# Patient Record
Sex: FEMALE | Race: WHITE
[De-identification: names, ages, dates, MRNs, and addresses within clinical notes are randomized per-mention and may not be internally consistent; named-entity substitution may affect disease eponyms.]

---

## 2017-03-02 ENCOUNTER — HOSPITAL ENCOUNTER (OUTPATIENT)
Dept: HOSPITAL 37 - NWCC | Age: 47
End: 2017-03-02
Attending: SURGERY
Payer: COMMERCIAL
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
## 2017-03-02 DIAGNOSIS — B96.89: ICD-10-CM

## 2017-03-02 DIAGNOSIS — T81.4XXA: Primary | ICD-10-CM

## 2017-03-02 PROCEDURE — 11042 DBRDMT SUBQ TIS 1ST 20SQCM/<: CPT

## 2017-03-02 NOTE — WOUNDPNF
DATE

03/02/2017

 

HISTORY

This patient underwent incision and drainage of a very large subcutaneous abscess at the right upper 
back

by Dr. Murray on 02/16/2017.  The patient did initially have a wound VAC applied at this open wound. 
 The

insurance company did deny use of the wound VAC for the patient.  The wound VAC was therefore 
removed at

the office visit on 02/27/2017.  Some Hydrofera Blue Classic Antibacterial Foam dressing was placed 
into

the wound at that time.  A 4 inch x 4 inch (10.2 cm x 10.2 cm) piece of Hydrofera Blue Classic

Antibacterial Foam dressing was placed in the wound at that time.  This was covered with a Mepilex

dressing.  The patient comes back to [facility] today for a dressing

change.

 

PHYSICAL EXAMINATION

BACK:  The patient does have a large transversely oriented open wound at the right upper back.  This

wound is 8.6 cm long in a right-to-left direction.  The wound is 0.9 cm wide in a 
superior-to-inferior

direction.  The wound is 3.2 cm deep today.  There is healthy red granulation tissue at most of the 
open

surface of the wound.  There is some white devitalized tissue at about 5% of the wound at the

subcutaneous tissue level.

VITAL SIGNS:  Temperature is 96.7 degrees.  Blood pressure is 162/93.  Pulse is 85.  Weight is 250 
pounds.

 

IMPRESSION

Large open wound at right upper back following incision and drainage of large subcutaneous abscess 
on

02/16/2017.

 

TREATMENT

The Mepilex and Hydrofera Blue Classic Antibacterial Foam dressings were removed at the open wound 
at the

right upper back today by [facility] nurses.  Wound assessment was

performed by Dr. Murray.  Some EMLA Cream was applied to the open wound for topical anesthesia.  
Sharp

excisional debridement of nonviable white devitalized tissue at the subcutaneous tissue level was 
then

performed by Dr. Murray.  This was sharp excisional debridement performed by Dr. Murray with a 
dermal

curette.  This sharp excisional debridement with a dermal curette was performed at about 5% of the 
open

surface of the wound.  This did result in some bleeding which was controlled with application of 
direct

pressure.  Satisfactory hemostasis was achieved.  Instructions for ongoing care were then provided 
by Dr. Murray.  The [facility] nurses then placed more of the Hydrofera 
Blue

Classic Antibacterial Foam dressing into the wound and covered this with some Mepilex dressing.

 

PLAN

1.  The patient will return back to [facility] again on 03/06/2017 
for

another dressing change.

2.  The patient will return back to [facility] for another dressing

change as well as assessment of the wound again by Dr. Murray on 03/09/2017.

 

CEDRICK

## 2017-03-06 ENCOUNTER — HOSPITAL ENCOUNTER (OUTPATIENT)
Dept: HOSPITAL 37 - NWCC | Age: 47
End: 2017-03-06
Attending: SURGERY
Payer: COMMERCIAL

## 2017-03-06 DIAGNOSIS — B96.89: ICD-10-CM

## 2017-03-06 DIAGNOSIS — I10: ICD-10-CM
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
## 2017-03-06 DIAGNOSIS — T81.4XXA: Primary | ICD-10-CM

## 2017-03-09 ENCOUNTER — HOSPITAL ENCOUNTER (OUTPATIENT)
Dept: HOSPITAL 37 - NWCC | Age: 47
End: 2017-03-09
Attending: SURGERY
Payer: COMMERCIAL
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
## 2017-03-09 DIAGNOSIS — B96.89: ICD-10-CM

## 2017-03-09 DIAGNOSIS — T81.4XXA: Primary | ICD-10-CM

## 2017-03-09 PROCEDURE — 99213 OFFICE O/P EST LOW 20 MIN: CPT

## 2017-03-10 NOTE — WOUNDPNF
DATE

03/09/2017

 

HISTORY

This patient underwent incision and drainage of a very large subcutaneous 
abscess at the right upper back

by Dr. Murray on 02/16/2017.  The patient did initially have a wound VAC 
applied to this open wound.  The

insurance company did deny use of the wound VAC for the patient.  The wound VAC 
was therefore removed at

the [facility] visit on 02/27/2017.  The 
patient has had dressing changes at the

wound performed with Hydrofera Blue Classic antibacterial foam dressing covered 
with Mepilex dressing

since 02/27/2017.  The patient does come in for a [facility] visit today

for a dressing change.

 

PHYSICAL EXAMINATION

VITAL SIGNS:  Blood pressure is 151/84. Temperature is 97.2 degrees.  Pulse is 
60.  Weight is 250 pounds.

BACK:  The patient does have a large transversely oriented open wound at the 
right upper back.  This

wound is 8.2 cm long in a right-to-left direction.  The wound is 1.2 cm wide in 
a superior-to-inferior

direction.  The wound is 2.5 cm deep today.  There is healthy red granulation 
tissue at most of the open

surface of the wound.  There is some white devitalized tissue at about 10% of 
the subcutaneous tissue

level of the open wound.

 

IMPRESSION

Large open wound at right upper back following incision and drainage of large 
subcutaneous abscess on

02/16/2016.

 

TREATMENT

The Mepilex and Hydrofera Blue Classic antibacterial foam dressings were 
removed at the open wound at the

right upper back today by [facility] nurses.  
Wound assessment was

performed by Dr. Murray.  Sharp excisional debridement of nonviable white 
devitalized tissue at the

subcutaneous tissue level was then performed by Dr. Murray.  This was sharp 
excisional debridement

performed by Dr. Murray with a dermal curette.  This sharp excisional 
debridement with a dermal curette

was performed at about 10% of the open surface of the wound.  This did result 
in some bleeding which was

controlled with application of direct pressure.  Satisfactory hemostasis was 
achieved.  Instructions for

ongoing care were then provided by Dr. Murray.  The [facility] nurses

then placed more of the Hydrofera Blue Classic antibacterial foam dressing into 
the wound and covered

this with some Mepilex dressing.

 

PLAN

1.  Continue dressing changes with Hydrofera Blue Classic antibacterial foam 
dressing covered with

Mepilex dressings.  We will reapply with the insurance company for use of the 
wound VAC again after 30

days of treatment with failure of the wound to heal after 30 days.

2.  The patient will return back to [facility] 
again on 03/13/2017 for

another dressing change.

3.  The patient will then return back to [facility] again for another

dressing change as well as assessment of the wound again by Dr. Murray on 03/16/
2017.

 


CEDRICK

## 2017-03-13 ENCOUNTER — HOSPITAL ENCOUNTER (OUTPATIENT)
Dept: HOSPITAL 37 - NWCC | Age: 47
End: 2017-03-13
Attending: SURGERY
Payer: COMMERCIAL
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
## 2017-03-13 DIAGNOSIS — B96.89: ICD-10-CM

## 2017-03-13 DIAGNOSIS — T81.4XXA: Primary | ICD-10-CM

## 2017-03-16 ENCOUNTER — HOSPITAL ENCOUNTER (OUTPATIENT)
Dept: HOSPITAL 37 - NWCC | Age: 47
End: 2017-03-16
Attending: SURGERY
Payer: COMMERCIAL

## 2017-03-16 DIAGNOSIS — B96.89: ICD-10-CM
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
## 2017-03-16 DIAGNOSIS — T81.4XXA: Primary | ICD-10-CM

## 2017-03-16 PROCEDURE — 11042 DBRDMT SUBQ TIS 1ST 20SQCM/<: CPT

## 2017-03-16 NOTE — WOUNDPNF
DATE

03/16/2017

 

HISTORY

This patient underwent incision and drainage of a very large subcutaneous 
abscess at the right upper back

by Dr. Murray on 02/16/2017.  The patient did initially have a wound VAC 
applied to this open wound.  The

insurance company did deny use of the wound VAC for the patient.  The wound VAC 
was therefore removed at

[facility] on 02/27/2017.  The patient then had 
dressing changes at the

wound performed from 02/27/2017 up until today with Hydrofera Blue Classic 
Antibacterial Foam Dressing

covered with Mepilex dressing.  The insurance company has now approved use of 
the wound VAC so the

patient can start using the wound VAC again.

 

PHYSICAL EXAMINATION

BACK:  The patient does have a transversely oriented open wound at the right 
upper back.  There is no necrotic

tissue or purulence at the wound.  The patient does have skin growing deep down 
into the subcutaneous

tissue level around all sides of the wound.  The skin is growing down deep into 
the wound well below the

level of the surrounding skin on all sides.  When the open portion of the wound 
is measured not including

the area where skin is growing down into the subcutaneous tissue level, the 
wound appears to be 5.8 cm

long in a right-to-left direction, 0.8 cm wide in a superior-to-inferior 
direction and 1.8 cm deep.  This

was prior to any debridement today.

 

IMPRESSION

Large open wound at right upper back following incision and drainage of a large 
subcutaneous abscess on

02/16/2017.

 

TREATMENT

The Mepilex and Hydrofera Blue Classic Antibacterial Foam dressings were 
removed at the open wound at the

right upper back today by [facility] nurses.  
Wound assessment was

performed by Dr. Murray.  All of this new epidermis which was growing down over 
the subcutaneous tissue

deep down into the subcutaneous tissue level on all sides of the wound was 
removed with sharp excisional

debridement by Dr. Murray with a dermal curette.  EMLA Cream was first applied 
to the open wound for a

while before this was started to produce topical anesthesia.  After this, Dr. Murray did use a dermal

curette to remove all of the skin which was growing down deep into the 
subcutaneous tissue level on all

sides of the wound so this wound will have a normal contour after it heals and 
not have a deep hole at

the back lined with skin.  This sharp excisional debridement with a dermal 
curette did produce quite a

bit of bleeding.  This bleeding was controlled with chemical cauterization with 
silver nitrate sticks.

Satisfactory hemostasis was achieved.  Instructions for ongoing care were then 
provided by Dr. Murray.

The wound was measured after all this debridement of skin growing down into the 
subcutaneous tissue

level.  The measurements after debridement showed that the wound was 7.5 cm 
long in a right-to-left

direction, 1.5 cm wide in a superior-to-inferior direction and 1.6 cm deep.  
The [facility]

[facility] nurses did then apply the wound VAC dressing to this open 
wound.  Negative pressure

for a wound VAC was set at 125 mmHg.

 

PLAN

1.  Continue negative pressure therapy to open wound at right upper back with a 
wound VAC.

2.  Return back to [facility] [place] for a wound VAC 
dressing change again on

03/20/2017.

3.  The patient will return back to [facility] 
for another wound VAC

dressing change again on 03/23/2017.  The wound will be reassessed by Dr. Murray again at that time.

 

CEDRICK

## 2017-03-20 ENCOUNTER — HOSPITAL ENCOUNTER (OUTPATIENT)
Dept: HOSPITAL 37 - NWCC | Age: 47
End: 2017-03-20
Attending: SURGERY
Payer: COMMERCIAL

## 2017-03-20 DIAGNOSIS — B96.89: ICD-10-CM

## 2017-03-20 DIAGNOSIS — T81.4XXA: Primary | ICD-10-CM
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
## 2017-03-20 PROCEDURE — 97605 NEG PRS WND THER DME<=50SQCM: CPT

## 2017-03-23 ENCOUNTER — HOSPITAL ENCOUNTER (OUTPATIENT)
Dept: HOSPITAL 37 - NWCC | Age: 47
End: 2017-03-23
Attending: SURGERY
Payer: COMMERCIAL

## 2017-03-23 DIAGNOSIS — T81.4XXA: Primary | ICD-10-CM
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
## 2017-03-23 DIAGNOSIS — B96.89: ICD-10-CM

## 2017-03-23 PROCEDURE — 11042 DBRDMT SUBQ TIS 1ST 20SQCM/<: CPT

## 2017-03-23 PROCEDURE — 97605 NEG PRS WND THER DME<=50SQCM: CPT

## 2017-03-24 NOTE — WOUNDPNF
DATE

03/23/2017

 

HISTORY

This patient underwent incision and drainage of a very large subcutaneous 
abscess at the right upper back

by Dr. Murray on 02/16/2017.  She has an open wound at the incision and 
drainage site.  The patient has a

wound V.A.C. applied to this open wound at this time.

 

PHYSICAL EXAMINATION

BACK:  The patient does have a transversely oriented open wound at the right 
upper back.  There is no

necrotic tissue or purulence at the wound.  The patient does have some skin 
growing down into the

subcutaneous tissue level at about 20% of the open surface of the wound.  This 
is skin growing down to

the base of the wound deep to the level of the skin at the subcutaneous tissue 
level.  The wound is 6.5

cm long in a right-to-left direction.  The wound is 0.8 cm wide in a superior-to
-inferior direction.  The

wound is 1 cm deep today.

 

IMPRESSION

Large open wound at right upper back following incision and drainage of a large 
subcutaneous abscess on

02/16/2017.

 

TREATMENT

The wound V.A.C. was removed today from the wound at the right upper back by 
[facility] nurses.  EMLA cream was applied to the open wound for a while to 
produce topical

anesthesia.  Wound assessment was then performed by Dr. Murray.  Dr. Murray did 
use a dermal curette to

remove all the areas where skin was growing deep down into the subcutaneous 
tissue level.  This was sharp

excisional debridement with a dermal curette at about 20% of the surface of the 
open wound today.  This

sharp excisional debridement did produce bleeding which was controlled by 
chemical cauterization with

silver nitrate sticks.  Satisfactory hemostasis was achieved.  Instructions for 
ongoing care were then

provided by Dr. Murray.  It was thought that this debridement of the skin which 
was growing deep down

into the subcutaneous tissue level would allow the wound to heal in a way that 
would produce a normal

contour after it heals and prevent the patient from having a deep hole at the 
back lined with skin.

[facility] nurses did apply a new wound V.A.C. 
dressing to the open

wound.  Negative pressure for the wound V.A.C. is set at 150 mmHg.

 

PLAN

1.  Continue negative pressure therapy to open wound at right upper back with 
wound V.A.C. at 150 mmHg

negative pressure.

2.  Return back to [facility] for another wound 
V.A.C. dressing change

again on 03/27/2017.

3.  The patient will return back to [facility] 
for another wound V.A.C.

dressing change again on 03/30/2017.  The wound will be reassessed by Dr. Murray again at that time.

 

CEDRICK

## 2017-03-27 ENCOUNTER — HOSPITAL ENCOUNTER (OUTPATIENT)
Dept: HOSPITAL 37 - NWCC | Age: 47
End: 2017-03-27
Attending: SURGERY
Payer: COMMERCIAL
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
## 2017-03-27 DIAGNOSIS — T81.4XXA: Primary | ICD-10-CM

## 2017-03-27 DIAGNOSIS — B96.89: ICD-10-CM

## 2017-03-27 PROCEDURE — 97605 NEG PRS WND THER DME<=50SQCM: CPT

## 2017-03-30 ENCOUNTER — HOSPITAL ENCOUNTER (OUTPATIENT)
Dept: HOSPITAL 37 - NWCC | Age: 47
End: 2017-03-30
Attending: SURGERY
Payer: COMMERCIAL

## 2017-03-30 DIAGNOSIS — B96.89: ICD-10-CM
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
## 2017-03-30 DIAGNOSIS — T81.4XXA: Primary | ICD-10-CM

## 2017-03-30 PROCEDURE — 97597 DBRDMT OPN WND 1ST 20 CM/<: CPT

## 2017-03-30 NOTE — WOUNDPNF
DATE

03/30/2017

 

HISTORY

This patient underwent incision and drainage of a very large subcutaneous 
abscess at the right upper back

by Dr. Murray on 02/16/2017.  She has an open wound at the incision and 
drainage site.  The patient has a

wound V.A.C. applied to this open wound at this time.

 

PHYSICAL EXAMINATION

BACK:  The patient does have a transversely oriented open wound at the right 
upper back.  There is no

necrotic tissue or purulence at the wound.  The patient does have some skin 
growing down into the

subcutaneous tissue level at about 5% of the open surface of the wound.  The 
wound is 6 cm long in a

right-to-left direction.  The wound is 0.7 cm wide in a superior-to-inferior 
direction.  The wound is 0.7

cm deep today.

 

IMPRESSION

Large open wound at right upper back following incision and drainage of a large 
subcutaneous abscess on

02/16/2017.

 

TREATMENT

The wound V.A.C. was removed today from the wound at the right upper back by 
[facility] nurses.  Wound assessment was performed by Dr. Murray.  Dr. Murray did use a dermal

curette to remove the area where skin was growing down into the subcutaneous 
tissue level.  This was

sharp excisional debridement with a dermal curette at about 5% of the surface 
of the open wound today at

the subcutaneous tissue level.  This was done to remove the new epithelium 
which was growing down the

sides of the wound into the subcutaneous tissue level.  This sharp excisional 
debridement did produce

bleeding, which was controlled by chemical cauterization with silver nitrate 
sticks.  Satisfactory

hemostasis was achieved.  Instructions for ongoing care were provided by Dr. Murray.  It was thought that

this debridement of the skin which was growing down into the subcutaneous 
tissue level would allow the

wound to heal in a way that would produce a normal contour after it heals and 
prevent the patient from

having a deep hole at the back lined with skin.  [facility] nurses did

then apply a new wound V.A.C. dressing to the open wound.  Negative pressure 
for the wound V.A.C. was set

at 150 mmHg.

 

PLAN

1.  Continue negative-pressure therapy at open wound at right upper back with 
wound V.A.C. at 150 mmHg

negative pressure.

2.  Return back to [facility] again for another 
wound V.A.C. dressing

change on 04/03/2017.

3.  The patient will return back to [facility] 
for another wound V.A.C.

dressing change again on 04/06/2017.  The wound will be reassessed by Dr. Murray again at that time.

 

CEDRICK

## 2017-04-03 ENCOUNTER — HOSPITAL ENCOUNTER (OUTPATIENT)
Dept: HOSPITAL 37 - NWCC | Age: 47
End: 2017-04-03
Attending: SURGERY
Payer: COMMERCIAL
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
## 2017-04-03 DIAGNOSIS — T81.4XXA: Primary | ICD-10-CM

## 2017-04-03 DIAGNOSIS — B96.89: ICD-10-CM

## 2017-04-03 DIAGNOSIS — E11.8: ICD-10-CM

## 2017-04-03 PROCEDURE — 97605 NEG PRS WND THER DME<=50SQCM: CPT

## 2017-04-06 ENCOUNTER — HOSPITAL ENCOUNTER (OUTPATIENT)
Dept: HOSPITAL 37 - NWCC | Age: 47
End: 2017-04-06
Attending: SURGERY
Payer: COMMERCIAL

## 2017-04-06 DIAGNOSIS — B96.89: ICD-10-CM

## 2017-04-06 DIAGNOSIS — T81.4XXA: Primary | ICD-10-CM
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
## 2017-04-06 PROCEDURE — 99213 OFFICE O/P EST LOW 20 MIN: CPT

## 2017-04-07 NOTE — WOUNDPNF
DATE

04/06/2017

 

HISTORY

This patient underwent incision and drainage of a very large subcutaneous abscess at the right upper 
back

by Dr. Murray on 02/16/2017.  She has an open wound at the incision and drainage site.  The patient 
has

had a wound V.A.C. applied to this open wound up until this time.

 

PHYSICAL EXAMINATION

VITAL SIGNS:  Blood pressure is 176/92.  Pulse is 93.  Temperature is 96 degrees.  Respiratory rate 
is

18.  BACK:  The patient does have a transversely oriented open wound at the right upper back.  There 
is

no necrotic tissue or purulence at the wound.  The patient does have 100% of the open surface of the

wound consisting of healthy red granulation tissue.  The wound is 4.7 cm long in a right-to-left

direction.  The wound is 0.4 cm wide in a superior-to-inferior direction.  The wound is 0.3 cm deep 
today.

 

IMPRESSION

Large open wound at right upper back following incision and drainage of a large subcutaneous abscess 
on

02/16/2017.

 

TREATMENT

The wound V.A.C. was removed today at the wound at the right upper back by [facility] nurses.  Wound assessment was performed by Dr. Murray.  No debridement of the wound 
was

needed today.  Instructions for ongoing care were provided by Dr. Murray.  [facility] 
[facility] nurses did then apply some Jesi and Mepilex dressings to the open wound.

 

PLAN

1.  Discontinue negative pressure therapy with wound V.A.C..

2.  Dressing changes at open wound with Jesi and Mepilex wound care dressings every three days. 
The

patient will come back to [facility] for these dressing changes.

3.  The patient will come back for a dressing change at [facility] 
again

on 04/13/2017 and the wound will be reassessed by Dr. Murray again at that time.

 

CEDRICK

## 2017-04-10 ENCOUNTER — HOSPITAL ENCOUNTER (OUTPATIENT)
Dept: HOSPITAL 37 - NWCC | Age: 47
End: 2017-04-10
Attending: SURGERY
Payer: COMMERCIAL
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
## 2017-04-10 DIAGNOSIS — T81.4XXA: Primary | ICD-10-CM

## 2017-04-10 DIAGNOSIS — B96.89: ICD-10-CM

## 2017-04-13 ENCOUNTER — HOSPITAL ENCOUNTER (OUTPATIENT)
Dept: HOSPITAL 37 - NWCC | Age: 47
End: 2017-04-13
Attending: SURGERY
Payer: COMMERCIAL

## 2017-04-13 DIAGNOSIS — B96.89: ICD-10-CM
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
## 2017-04-13 DIAGNOSIS — T81.4XXA: Primary | ICD-10-CM

## 2017-04-13 PROCEDURE — 17250 CHEM CAUT OF GRANLTJ TISSUE: CPT

## 2017-04-14 NOTE — WOUNDPNF
DATE

04/13/2017

 

HISTORY

This patient underwent incision and drainage of a very large subcutaneous abscess at the right upper 
back

by Dr. Murray on 02/16/2017.  She has an open wound at the incision and drainage site which has 
slowly

been healing closed since then.  She has received treatment at [facility]

since the time of the procedure performed on 02/16/2017.

 

PHYSICAL EXAMINATION

BACK:  The patient does have an open wound at the right upper back which is nearly completely healed

closed at this time.  The patient has a transversely oriented scar at this area.  The only remaining 
open

area is a 0.1 cm x 0.3 cm open area.  There is a little bit of hypergranulation tissue at this tiny

remaining open area.  This is at the lateral end of the wound.

 

IMPRESSION

Open wound at right upper back following incision and drainage of a large subcutaneous abscess on

02/16/2017.

 

TREATMENT

The Mepilex wound care dressings which were applied at the last [facility]

[facility] visit were removed today by [facility] nurses.  Wound 
assessment

was performed by Dr. Murray.  The hypergranulation tissue which was projecting up above the level of

normal skin was cauterized with a silver nitrate stick today by Dr. Murray.  Instructions for 
ongoing

care were then provided by Dr. Murray.  The [facility] nurses did 
then

apply a Allevyn Classic adhesive wound care dressing to the remaining open area at the wound.

 

PLAN

1.  The Allevyn wound care dressing can be kept on for the next 5-7 days.

2.  An appointment is made for the patient to come back to [facility] 
[facility]

again on 04/20/2017 for reassessment of the wound by Dr. Murray again at that time.

3.  I did today give the patient a written letter stating that she can return back to work on 
04/24/2017

without restrictions.  This is a return to work letter for the employer of this patient.

 

CEDRICK

## 2017-04-20 ENCOUNTER — HOSPITAL ENCOUNTER (OUTPATIENT)
Dept: HOSPITAL 37 - NWCC | Age: 47
End: 2017-04-20
Attending: SURGERY
Payer: COMMERCIAL

## 2017-04-20 DIAGNOSIS — B96.89: ICD-10-CM

## 2017-04-20 DIAGNOSIS — T81.4XXA: Primary | ICD-10-CM
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
## 2017-04-21 NOTE — WOUNDPNF
DATE

04/20/2017

 

HISTORY

This patient underwent incision and drainage of a very large subcutaneous abscess at the right upper 
back

by Dr. Murray on 02/16/2017.  She did have an open wound at the incision and drainage site which has

gradually been healing closed since then with treatment at [facility].

 

PHYSICAL EXAMINATION

BACK:  The previous open wound at the right upper back is completely healed closed.  There is a

transversely oriented scar where this open wound had been located.  The wound is now healed 
completely

closed.

 

IMPRESSION

Complete healing of a large open wound at right upper back following incision and drainage of a 
large

subcutaneous abscess on 02/16/2017.

 

TREATMENT

The dressings were removed at the wound at the right upper back by [facility]

nurses.  Wound assessment was performed by Dr. Murray.   Instructions for ongoing care were provided 
by

Dr. Murray.  It was not necessary to apply any further dressings to this wound at the right upper 
back at

this time since it is completely healed.

 

PLAN

No further routine followup visits at [facility] are scheduled.  
Follow

up micah PHILIP

## 2024-11-13 ENCOUNTER — APPOINTMENT (RX ONLY)
Dept: URBAN - METROPOLITAN AREA CLINIC 175 | Facility: CLINIC | Age: 54
Setting detail: DERMATOLOGY
End: 2024-11-13

## 2024-11-13 DIAGNOSIS — L64.8 OTHER ANDROGENIC ALOPECIA: ICD-10-CM

## 2024-11-13 PROCEDURE — ? PRESCRIPTION

## 2024-11-13 PROCEDURE — ? COUNSELING

## 2024-11-13 PROCEDURE — 99203 OFFICE O/P NEW LOW 30 MIN: CPT

## 2024-11-13 PROCEDURE — ? PRESCRIPTION MEDICATION MANAGEMENT

## 2024-11-13 PROCEDURE — ? ADDITIONAL NOTES

## 2024-11-13 RX ORDER — MINOXIDIL 2.5 MG/1
TABLET ORAL
Qty: 30 | Refills: 2 | Status: ERX | COMMUNITY
Start: 2024-11-13

## 2024-11-13 RX ADMIN — MINOXIDIL: 2.5 TABLET ORAL at 00:00

## 2024-11-13 ASSESSMENT — LOCATION ZONE DERM: LOCATION ZONE: SCALP

## 2024-11-13 ASSESSMENT — LOCATION SIMPLE DESCRIPTION DERM: LOCATION SIMPLE: LEFT SCALP

## 2024-11-13 ASSESSMENT — LOCATION DETAILED DESCRIPTION DERM: LOCATION DETAILED: LEFT MEDIAL FRONTAL SCALP

## 2024-11-13 NOTE — PROCEDURE: ADDITIONAL NOTES
Render Risk Assessment In Note?: no
Detail Level: Simple
Additional Notes: Pt has had hair loss since high school, discussed with her she may have slower regrowth of hair due to the extent of the hair loss.

## 2025-06-04 ENCOUNTER — APPOINTMENT (OUTPATIENT)
Dept: URBAN - METROPOLITAN AREA CLINIC 175 | Facility: CLINIC | Age: 55
Setting detail: DERMATOLOGY
End: 2025-06-04

## 2025-06-04 ENCOUNTER — RX ONLY (RX ONLY)
Age: 55
End: 2025-06-04

## 2025-06-04 DIAGNOSIS — L64.8 OTHER ANDROGENIC ALOPECIA: ICD-10-CM | Status: IMPROVED

## 2025-06-04 PROCEDURE — ? ADDITIONAL NOTES

## 2025-06-04 PROCEDURE — ? COUNSELING

## 2025-06-04 PROCEDURE — ? PRESCRIPTION MEDICATION MANAGEMENT

## 2025-06-04 PROCEDURE — 99213 OFFICE O/P EST LOW 20 MIN: CPT

## 2025-06-04 RX ORDER — MINOXIDIL 2.5 MG/1
TABLET ORAL
Qty: 30 | Refills: 6 | Status: CANCELLED
Stop reason: CLARIF

## 2025-06-04 RX ORDER — MINOXIDIL 2.5 MG/1
TABLET ORAL
Qty: 30 | Refills: 6 | Status: ERX

## 2025-06-04 ASSESSMENT — LOCATION ZONE DERM: LOCATION ZONE: SCALP

## 2025-06-04 ASSESSMENT — LOCATION DETAILED DESCRIPTION DERM: LOCATION DETAILED: LEFT MEDIAL FRONTAL SCALP

## 2025-06-04 ASSESSMENT — LOCATION SIMPLE DESCRIPTION DERM: LOCATION SIMPLE: LEFT SCALP

## 2025-06-04 NOTE — PROCEDURE: ADDITIONAL NOTES
Render Risk Assessment In Note?: no
Detail Level: Simple
Additional Notes: *Discussed Spironolactone in depth - patient opted to try this however reviewed patients medication list and there is a drug interaction between Spironolactone and Lisinopril. Discussed incorporating a topical for treatment. Patient declined any topicals at this time. Will continue treating with oral Minoxidil for now.

## 2025-06-04 NOTE — PROCEDURE: PRESCRIPTION MEDICATION MANAGEMENT
Render In Strict Bullet Format?: No
Continue Regimen: *Minoxidil 2.5mg daily
Initiate Treatment: *Spironolactone 100mg QAM
Detail Level: Zone